# Patient Record
Sex: MALE
[De-identification: names, ages, dates, MRNs, and addresses within clinical notes are randomized per-mention and may not be internally consistent; named-entity substitution may affect disease eponyms.]

---

## 2021-12-23 PROBLEM — Z00.00 ENCOUNTER FOR PREVENTIVE HEALTH EXAMINATION: Status: ACTIVE | Noted: 2021-12-23

## 2022-03-03 ENCOUNTER — APPOINTMENT (OUTPATIENT)
Dept: OTOLARYNGOLOGY | Facility: CLINIC | Age: 22
End: 2022-03-03
Payer: COMMERCIAL

## 2022-03-03 VITALS
WEIGHT: 140 LBS | HEART RATE: 80 BPM | TEMPERATURE: 98 F | HEIGHT: 69.5 IN | SYSTOLIC BLOOD PRESSURE: 141 MMHG | BODY MASS INDEX: 20.27 KG/M2 | DIASTOLIC BLOOD PRESSURE: 93 MMHG

## 2022-03-03 DIAGNOSIS — Z01.10 ENCOUNTER FOR EXAMINATION OF EARS AND HEARING W/OUT ABNORMAL FINDINGS: ICD-10-CM

## 2022-03-03 DIAGNOSIS — J31.0 CHRONIC RHINITIS: ICD-10-CM

## 2022-03-03 DIAGNOSIS — H69.81 OTHER SPECIFIED DISORDERS OF EUSTACHIAN TUBE, RIGHT EAR: ICD-10-CM

## 2022-03-03 DIAGNOSIS — J34.2 DEVIATED NASAL SEPTUM: ICD-10-CM

## 2022-03-03 DIAGNOSIS — M26.609 UNSPECIFIED TEMPOROMANDIBULAR JOINT DISORDER: ICD-10-CM

## 2022-03-03 PROCEDURE — 92588 EVOKED AUDITORY TST COMPLETE: CPT

## 2022-03-03 PROCEDURE — 92557 COMPREHENSIVE HEARING TEST: CPT

## 2022-03-03 PROCEDURE — 92567 TYMPANOMETRY: CPT

## 2022-03-03 PROCEDURE — 99204 OFFICE O/P NEW MOD 45 MIN: CPT | Mod: 25

## 2022-03-03 PROCEDURE — 31233 NSL/SINS NDSC DX MAX SINUSC: CPT

## 2022-03-03 RX ORDER — METHYLPREDNISOLONE 4 MG/1
4 TABLET ORAL
Qty: 1 | Refills: 1 | Status: ACTIVE | COMMUNITY
Start: 2022-03-03 | End: 1900-01-01

## 2022-03-03 NOTE — DATA REVIEWED
[de-identified] : Hearing Test performed to evaluate the extent of hearing loss and  to explain pt's symptoms\par today's hearing test was personally reviewed and revealed\par Type A tymps in each ear and hearing is WNL bilaterally. DPOAE's present at 12/12 data points in each ear - pass bilaterally.

## 2022-03-03 NOTE — PROCEDURE
[FreeTextEntry6] : \par Anterior Rhinoscopy insufficient to account for symptoms.\par \par After informed verbal consent is obtained, the fiberoptic nasal endoscope #3 is passed via the right nasal cavity.\par The following anatomic sites were directly examined in a sequential fashion:\par The scope was introduced in both  nasal passages between the middle and inferior turbinates to exam the inferior portion of the middle meatus and the fontanelle, as well as the maxillary ostia.  Next, the scope was passed medially and posteriorly to the middle turbinates to examine the sphenoethmoid recess and the superior turbinate region.\par   There is [ 0 ]% obstruction of the nasopharynx with adenoid tissue.\par \par A deviated nasal septum was noted causing obstruction.\par The turbinates were congested-bilateral.\par \par Right Side:\par ·	Mucosa: wnl\par ·	Mucous: none\par ·	Polyp: none\par ·	Inferior Turbinate: sl congested\par ·	Middle Turbinate:sl congested\par ·	Superior Turbinate: wnl\par ·	Inferior Meatus:clear\par ·	Middle Meatus: clear\par ·	Super Meatus: clear\par ·	Sphenoethmoidal Recess: wnl\par \par \par \par Left Side:\par ·	Mucosa: wnl\par ·	Mucous:none\par ·	Polyp: none\par ·	Inferior Turbinate: sl congested\par ·	Middle Turbinate: sl congested\par ·	Superior Turbinate:wnl\par ·	Inferior Meatus: clear\par ·	Middle Meatus: clear\par ·	Super Meatus:clear\par ·	Sphenoethmoidal Recess: wnl\par \par \par

## 2022-03-03 NOTE — END OF VISIT
[FreeTextEntry3] : I personally saw and examined Sylvester Gee in detail. I spoke to IBAN Montano regarding the assessment and plan of care. I reviewed the above assessment and plan of care, and agree. I have made changes in changes in the body of the note where appropriate.I personally reviewed the HPI, PMH, FH, SH, ROS and medications/allergies. I have spoken to IBAN Montano regarding the history and have personally determined the assessment and plan of care, and documented this myself. I was present and participated in all key portions of the encounter and all procedures noted above. I have made changes in the body of the note where appropriate.\par \par Attesting Faculty: See Attending Signature Below \par \par \par  [Time Spent: ___ minutes] : I have spent [unfilled] minutes of time on the encounter.

## 2022-03-03 NOTE — HISTORY OF PRESENT ILLNESS
[No] : patient does not have a  history of radiation therapy [de-identified] : 22 yo male\par Patient complains of clogged right ear x 1 month. States he got of a flight and since then its been clogged. He cant pop the ear. Ear is very sensitive to loud sounds and steamy showers make it worse. Also gets intermittent tinnitus right ear.  Denies nasal congestion. \par Dx w/ TMJ by another ENT-MD [Hearing Loss] : hearing loss [Ear Fullness] : ear fullness [Tinnitus] : no tinnitus [Otalgia] : no otalgia [Otorrhea] : no otorrhea [Vertigo] : no vertigo [Eustachian Tube Dysfunction] : no eustachian tube dysfunction [Cholesteatoma] : no cholesteatoma [Nasal Congestion] : no nasal congestion [Allergic Rhinitis] : no allergic rhinitis [Asthma] : no asthma [Adenoidectomy] : no adenoidectomy [Hyperthyroidism] : no hyperthyroidism [Sialadenitis] : no sialadenitis [Hodgkin Disease] : no hodgkin disease [Non-Hodgkin Lymphoma] : no non-hodgkin lymphoma [None] : No risk factors have been identified. [Graves Disease] : no graves disease [Thyroid Cancer] : no thyroid cancer

## 2022-03-03 NOTE — ASSESSMENT
[FreeTextEntry1] : Patient complains of clogged right ear x 1 month. States he got of a flight and since then its been clogged. He cant pop the ear. Ear is very sensitive to loud sounds and steamy showers make it worse. Denies nasal congestion. \par \par r/o Right ETD\par r/o TMJ\par -Hearing Test performed to evaluate the extent of hearing loss and to explain pt's symptoms-wnl\par Rx-medrol dospak\par poss CT scan or MRI  of PNS or temporal Bones upon f/u\par consider M&T\par \par f/u prn and next week